# Patient Record
Sex: MALE | Race: OTHER | NOT HISPANIC OR LATINO | URBAN - METROPOLITAN AREA
[De-identification: names, ages, dates, MRNs, and addresses within clinical notes are randomized per-mention and may not be internally consistent; named-entity substitution may affect disease eponyms.]

---

## 2021-05-02 ENCOUNTER — EMERGENCY (EMERGENCY)
Facility: HOSPITAL | Age: 61
LOS: 1 days | Discharge: HOME | End: 2021-05-02
Attending: EMERGENCY MEDICINE
Payer: COMMERCIAL

## 2021-05-02 VITALS
HEART RATE: 102 BPM | TEMPERATURE: 97 F | DIASTOLIC BLOOD PRESSURE: 84 MMHG | SYSTOLIC BLOOD PRESSURE: 185 MMHG | HEIGHT: 67 IN | OXYGEN SATURATION: 97 % | WEIGHT: 188.94 LBS | RESPIRATION RATE: 18 BRPM

## 2021-05-02 PROCEDURE — 99284 EMERGENCY DEPT VISIT MOD MDM: CPT | Mod: 25

## 2021-05-02 PROCEDURE — 99284 EMERGENCY DEPT VISIT MOD MDM: CPT

## 2021-05-02 PROCEDURE — 82962 GLUCOSE BLOOD TEST: CPT

## 2021-05-02 PROCEDURE — 93971 EXTREMITY STUDY: CPT

## 2021-05-02 RX ADMIN — Medication 1 APPLICATION(S): at 23:35

## 2021-05-02 NOTE — ED ADULT TRIAGE NOTE - CHIEF COMPLAINT QUOTE
R knee swelling x10 days s/p injury while playing tennis, reports "I have an allergic reaction from the brace I had on my knee"; ambulatory upon arrival

## 2021-05-02 NOTE — ED PROVIDER NOTE - MUSCULOSKELETAL NEGATIVE STATEMENT, MLM
r knee with well demarcated rash around his knee with crusting, no redness or warmth. raised dermatitis like

## 2021-05-02 NOTE — ED PROVIDER NOTE - PATIENT PORTAL LINK FT
You can access the FollowMyHealth Patient Portal offered by [facility] by registering at the following website: [email or web address]. By joining [facility]’s FollowMyHealth portal, you will also be able to view your health information using other applications (apps) compatible with our system.

## 2021-05-02 NOTE — ED PROVIDER NOTE - OBJECTIVE STATEMENT
pt is a 62 y/o male with r knee rash with itching after wearing a neoprene sleeve to that knee while playing tennis for the past 10 days, had a similar episdoe to his l elbow after a similar sleeve was placed with itching took 3 weeks for that to heal. no other sts, no pain or fever, no sob. pt played tennis today and sent in by his son for calf swelling to9 r/o dvt with no risk factors. pt is a 62 y/o male h/o dm on metformin with r knee rash with itching after wearing a neoprene sleeve to that knee while playing tennis for the past 10 days, had a similar episdoe to his l elbow after a similar sleeve was placed with itching took 3 weeks for that to heal. no other sts, no pain or fever, no sob. pt played tennis today and sent in by his son for calf swelling to9 r/o dvt with no risk factors.

## 2021-05-02 NOTE — ED PROVIDER NOTE - CLINICAL SUMMARY MEDICAL DECISION MAKING FREE TEXT BOX
rash around knee consistent with contact dermatitis, steroid cream rx, calf swelling with no tenderness, low risk for dvt but wants to r/o, le us ordered, no risk factors.

## 2021-05-02 NOTE — ED PROVIDER NOTE - MUSCULOSKELETAL, MLM
r knee with rash noted, well demarcated over location of knee where sleeve was placed, from, non tend, scaly, rash, pruritic, no d/c noted. ,

## 2021-05-02 NOTE — ED PROVIDER NOTE - ENMT, MLM
Airway patent, Nasal mucosa clear. Mouth with normal mucosa. Throat has no vesicles, no oropharyngeal exudates and uvula is midline.
SHORTNESS OF BREATH

## 2021-05-02 NOTE — ED PROVIDER NOTE - PROGRESS NOTE DETAILS
Attending note (Andrew): d/w patient results, no DVT; continue with topical steroid for probable contact dermatitis as per prior physician; d/w patient return precautions for any worsening / spreading redness to suggest infection, and also instructed patient to discontinue use of neoprene sleeve on leg.

## 2021-05-02 NOTE — ED PROVIDER NOTE - NSFOLLOWUPINSTRUCTIONS_ED_ALL_ED_FT
You were evaluated in the Emergency Department for redness around R knee.  You were evaluated and examined by a physician, and you had an ultrasound of the leg      Based on your evaluation: you likely have contact dermatitis (irritation/inflammation of the skin).    There is no evidence of a blood clot on your ultrasound.  There are no signs of emergency conditions requiring admission to the hospital on today's workup.  Based on the evaluation, a presumptive diagnosis was made, however, further evaluation may be required by your primary care physician or a specialist for a more definitive diagnosis.  Therefore, please follow-up as directed or return to the Emergency Department if your symptoms change or worsen.    We recommend that you:  1. See your primary care physician within the next 72 hours for follow up.  Bring a copy of your discharge paperwork (including any test results) to your doctor.  2. Use topical steroid as prescribed.  3. Take ibuprofen 400mg every 6 hours as needed for pain.       *** Return immediately if you have worsening pain or swelling, expanding redness of the leg, fever, or any other new/concerning symptoms. ***

## 2021-05-03 VITALS
SYSTOLIC BLOOD PRESSURE: 166 MMHG | HEART RATE: 75 BPM | RESPIRATION RATE: 17 BRPM | TEMPERATURE: 98 F | OXYGEN SATURATION: 97 % | DIASTOLIC BLOOD PRESSURE: 90 MMHG

## 2021-05-03 PROCEDURE — 93971 EXTREMITY STUDY: CPT | Mod: 26,RT

## 2021-05-03 NOTE — ED ADULT NURSE NOTE - OBJECTIVE STATEMENT
pt is a 60 y/o male h/o dm on metformin with r knee rash with itching after wearing a neoprene sleeve to that knee while playing tennis for the past 10 days, had a similar episdoe to his l elbow after a similar sleeve was placed with itching took 3 weeks for that to heal. no other sts, no pain or fever, no sob. pt played tennis today and sent in by his son for calf swelling to9 r/o dvt with no risk factors.